# Patient Record
Sex: MALE | Race: BLACK OR AFRICAN AMERICAN | NOT HISPANIC OR LATINO | Employment: UNEMPLOYED | RURAL
[De-identification: names, ages, dates, MRNs, and addresses within clinical notes are randomized per-mention and may not be internally consistent; named-entity substitution may affect disease eponyms.]

---

## 2018-01-29 ENCOUNTER — OFFICE VISIT (OUTPATIENT)
Dept: FAMILY MEDICINE CLINIC | Facility: CLINIC | Age: 63
End: 2018-01-29

## 2018-01-29 VITALS
DIASTOLIC BLOOD PRESSURE: 130 MMHG | BODY MASS INDEX: 20.61 KG/M2 | SYSTOLIC BLOOD PRESSURE: 210 MMHG | OXYGEN SATURATION: 98 % | WEIGHT: 136 LBS | HEIGHT: 68 IN | HEART RATE: 74 BPM | TEMPERATURE: 98 F

## 2018-01-29 DIAGNOSIS — Z23 INFLUENZA VACCINE NEEDED: ICD-10-CM

## 2018-01-29 DIAGNOSIS — I10 ESSENTIAL HYPERTENSION: Primary | ICD-10-CM

## 2018-01-29 PROCEDURE — 99202 OFFICE O/P NEW SF 15 MIN: CPT | Performed by: NURSE PRACTITIONER

## 2018-01-29 RX ORDER — LISINOPRIL AND HYDROCHLOROTHIAZIDE 20; 12.5 MG/1; MG/1
1 TABLET ORAL DAILY
Qty: 30 TABLET | Refills: 0 | Status: SHIPPED | OUTPATIENT
Start: 2018-01-29 | End: 2018-02-19 | Stop reason: SDUPTHER

## 2018-01-29 NOTE — PATIENT INSTRUCTIONS
Hypertension  Hypertension, commonly called high blood pressure, is when the force of blood pumping through your arteries is too strong. Your arteries are the blood vessels that carry blood from your heart throughout your body. A blood pressure reading consists of a higher number over a lower number, such as 110/72. The higher number (systolic) is the pressure inside your arteries when your heart pumps. The lower number (diastolic) is the pressure inside your arteries when your heart relaxes. Ideally you want your blood pressure below 120/80.  Hypertension forces your heart to work harder to pump blood. Your arteries may become narrow or stiff. Having untreated or uncontrolled hypertension can cause heart attack, stroke, kidney disease, and other problems.  What increases the risk?  Some risk factors for high blood pressure are controllable. Others are not.  Risk factors you cannot control include:  · Race. You may be at higher risk if you are .  · Age. Risk increases with age.  · Gender. Men are at higher risk than women before age 45 years. After age 65, women are at higher risk than men.  Risk factors you can control include:  · Not getting enough exercise or physical activity.  · Being overweight.  · Getting too much fat, sugar, calories, or salt in your diet.  · Drinking too much alcohol.  What are the signs or symptoms?  Hypertension does not usually cause signs or symptoms. Extremely high blood pressure (hypertensive crisis) may cause headache, anxiety, shortness of breath, and nosebleed.  How is this diagnosed?  To check if you have hypertension, your health care provider will measure your blood pressure while you are seated, with your arm held at the level of your heart. It should be measured at least twice using the same arm. Certain conditions can cause a difference in blood pressure between your right and left arms. A blood pressure reading that is higher than normal on one occasion does  not mean that you need treatment. If it is not clear whether you have high blood pressure, you may be asked to return on a different day to have your blood pressure checked again. Or, you may be asked to monitor your blood pressure at home for 1 or more weeks.  How is this treated?  Treating high blood pressure includes making lifestyle changes and possibly taking medicine. Living a healthy lifestyle can help lower high blood pressure. You may need to change some of your habits.  Lifestyle changes may include:  · Following the DASH diet. This diet is high in fruits, vegetables, and whole grains. It is low in salt, red meat, and added sugars.  · Keep your sodium intake below 2,300 mg per day.  · Getting at least 30-45 minutes of aerobic exercise at least 4 times per week.  · Losing weight if necessary.  · Not smoking.  · Limiting alcoholic beverages.  · Learning ways to reduce stress.  Your health care provider may prescribe medicine if lifestyle changes are not enough to get your blood pressure under control, and if one of the following is true:  · You are 18-59 years of age and your systolic blood pressure is above 140.  · You are 60 years of age or older, and your systolic blood pressure is above 150.  · Your diastolic blood pressure is above 90.  · You have diabetes, and your systolic blood pressure is over 140 or your diastolic blood pressure is over 90.  · You have kidney disease and your blood pressure is above 140/90.  · You have heart disease and your blood pressure is above 140/90.  Your personal target blood pressure may vary depending on your medical conditions, your age, and other factors.  Follow these instructions at home:  · Have your blood pressure rechecked as directed by your health care provider.  · Take medicines only as directed by your health care provider. Follow the directions carefully. Blood pressure medicines must be taken as prescribed. The medicine does not work as well when you skip  doses. Skipping doses also puts you at risk for problems.  · Do not smoke.  · Monitor your blood pressure at home as directed by your health care provider.  Contact a health care provider if:  · You think you are having a reaction to medicines taken.  · You have recurrent headaches or feel dizzy.  · You have swelling in your ankles.  · You have trouble with your vision.  Get help right away if:  · You develop a severe headache or confusion.  · You have unusual weakness, numbness, or feel faint.  · You have severe chest or abdominal pain.  · You vomit repeatedly.  · You have trouble breathing.  This information is not intended to replace advice given to you by your health care provider. Make sure you discuss any questions you have with your health care provider.  Document Released: 12/18/2006 Document Revised: 05/25/2017 Document Reviewed: 10/10/2014  [employer] Interactive Patient Education © 2017 [employer]

## 2018-01-29 NOTE — PROGRESS NOTES
Subjective   Porter Robertson is a 62 y.o. male.     HPI Comments: Patient also requesting flu vaccine today.     Hypertension   This is a chronic problem. Episode onset: has been on medicine off/on since he was in his 30's. Progression since onset: was last treated about 3 months ago, but no meds since that time.  Recently got insurance and now needs a new PCP.  [facility]  The problem is uncontrolled. Associated symptoms include blurred vision ( reports vision loss due to history of ? syphililis in 2002). Pertinent negatives include no anxiety, chest pain, headaches, malaise/fatigue, neck pain, orthopnea, palpitations, peripheral edema, PND, shortness of breath or sweats. Risk factors for coronary artery disease include male gender and smoking/tobacco exposure. Treatments tried: the only thing he remembers is having a diuretic, Improvement on treatment: when on meds, he reports b/p remained controlled.        The following portions of the patient's history were reviewed and updated as appropriate: allergies, current medications, past medical history, past social history, past surgical history and problem list.    Review of Systems   Constitutional: Negative for appetite change, fatigue, fever, malaise/fatigue and unexpected weight change.   HENT: Negative for congestion, sneezing and sore throat.    Eyes: Positive for blurred vision ( reports vision loss due to history of ? syphililis in 2002). Negative for discharge and itching.        History of vision loss     Respiratory: Negative for chest tightness, shortness of breath and wheezing ( had some wheezing several months ago, but subsided).    Cardiovascular: Negative for chest pain, palpitations, orthopnea, leg swelling and PND.   Gastrointestinal: Negative for nausea and vomiting.   Musculoskeletal: Negative for neck pain and neck stiffness.   Skin: Negative for rash.   Neurological: Negative for dizziness, light-headedness and  "headaches.       Objective    BP (!) 210/130 (BP Location: Left arm, Patient Position: Sitting, Cuff Size: Adult)  Pulse 74  Temp 98 °F (36.7 °C) (Tympanic)   Ht 172.7 cm (68\")  Wt 61.7 kg (136 lb)  SpO2 98%  BMI 20.68 kg/m2    Physical Exam   Constitutional: He is oriented to person, place, and time. No distress.   Neck: No thyromegaly present.   Cardiovascular: Normal rate and regular rhythm.    No peripheral edema noted     Pulmonary/Chest: Effort normal and breath sounds normal. He has no wheezes. He has no rales.   Neurological: He is alert and oriented to person, place, and time.   Nursing note and vitals reviewed.      Assessment/Plan   Porter was seen today for hypertension and flu vaccine.    Diagnoses and all orders for this visit:    Essential hypertension  -     lisinopril-hydrochlorothiazide (ZESTORETIC) 20-12.5 MG per tablet; Take 1 tablet by mouth Daily.    Influenza vaccine needed  -     Discontinue: influenza vac split quad (FLUZONE,FLUARIX,AFLURIA) injection 0.5 mL; Inject 0.5 mL into the shoulder, thigh, or buttocks 1 (One) Time.  -     influenza vac split quad (FLUZONE,FLUARIX,AFLURIA) injection 0.5 mL; Inject 0.5 mL into the shoulder, thigh, or buttocks 1 (One) Time.    Other orders  -     Cancel: Flu Vaccine Quad PF >18YR      Rx for Lisinopril/HCTZ provided  Appt to establish care and for management of HTN with Dr. Avalos on 9-20-55    Flu vaccine administered in office today.          "

## 2018-02-19 ENCOUNTER — OFFICE VISIT (OUTPATIENT)
Dept: FAMILY MEDICINE CLINIC | Facility: CLINIC | Age: 63
End: 2018-02-19

## 2018-02-19 VITALS
WEIGHT: 137 LBS | BODY MASS INDEX: 20.76 KG/M2 | SYSTOLIC BLOOD PRESSURE: 187 MMHG | DIASTOLIC BLOOD PRESSURE: 118 MMHG | RESPIRATION RATE: 16 BRPM | HEIGHT: 68 IN | TEMPERATURE: 98.6 F | HEART RATE: 64 BPM

## 2018-02-19 DIAGNOSIS — I10 ESSENTIAL HYPERTENSION: Primary | ICD-10-CM

## 2018-02-19 DIAGNOSIS — F10.10 ALCOHOL ABUSE: ICD-10-CM

## 2018-02-19 DIAGNOSIS — Z13.1 ENCOUNTER FOR SCREENING FOR DIABETES MELLITUS: ICD-10-CM

## 2018-02-19 DIAGNOSIS — R10.13 EPIGASTRIC PAIN: ICD-10-CM

## 2018-02-19 DIAGNOSIS — Z83.3 FAMILY HISTORY OF DIABETES MELLITUS: ICD-10-CM

## 2018-02-19 DIAGNOSIS — Z12.11 ENCOUNTER FOR SCREENING FOR MALIGNANT NEOPLASM OF COLON: ICD-10-CM

## 2018-02-19 DIAGNOSIS — R10.9 ABDOMINAL PAIN, UNSPECIFIED ABDOMINAL LOCATION: ICD-10-CM

## 2018-02-19 DIAGNOSIS — Z00.00 GENERAL MEDICAL EXAMINATION: ICD-10-CM

## 2018-02-19 PROCEDURE — 80053 COMPREHEN METABOLIC PANEL: CPT | Performed by: FAMILY MEDICINE

## 2018-02-19 PROCEDURE — 84425 ASSAY OF VITAMIN B-1: CPT | Performed by: FAMILY MEDICINE

## 2018-02-19 PROCEDURE — 36415 COLL VENOUS BLD VENIPUNCTURE: CPT | Performed by: FAMILY MEDICINE

## 2018-02-19 PROCEDURE — 82150 ASSAY OF AMYLASE: CPT | Performed by: FAMILY MEDICINE

## 2018-02-19 PROCEDURE — 80074 ACUTE HEPATITIS PANEL: CPT | Performed by: FAMILY MEDICINE

## 2018-02-19 PROCEDURE — 83690 ASSAY OF LIPASE: CPT | Performed by: FAMILY MEDICINE

## 2018-02-19 PROCEDURE — 82607 VITAMIN B-12: CPT | Performed by: FAMILY MEDICINE

## 2018-02-19 PROCEDURE — 85025 COMPLETE CBC W/AUTO DIFF WBC: CPT | Performed by: FAMILY MEDICINE

## 2018-02-19 PROCEDURE — 99204 OFFICE O/P NEW MOD 45 MIN: CPT | Performed by: FAMILY MEDICINE

## 2018-02-19 RX ORDER — LISINOPRIL AND HYDROCHLOROTHIAZIDE 20; 12.5 MG/1; MG/1
1 TABLET ORAL DAILY
Qty: 30 TABLET | Refills: 0 | Status: SHIPPED | OUTPATIENT
Start: 2018-02-19

## 2018-02-19 NOTE — PATIENT INSTRUCTIONS
-cut down on alcohol if possible read information provided    Start taking lisionpril-HCTZ daily.  Will send refill to pharmacy    Recheck in 2 weeks    Get labwork downstairs fasting.  Down elevator turn right    Will call for appt for colonoscopy screening     Alcohol Abuse and Nutrition  Alcohol abuse is any pattern of alcohol consumption that harms your health, relationships, or work. Alcohol abuse can affect how your body breaks down and absorbs nutrients from food by causing your liver to work abnormally. Additionally, many people who abuse alcohol do not eat enough carbohydrates, protein, fat, vitamins, and minerals. This can cause poor nutrition (malnutrition) and a lack of nutrients (nutrient deficiencies), which can lead to further complications.  Nutrients that are commonly lacking (deficient) among people who abuse alcohol include:  · Vitamins.  ¨ Vitamin A. This is stored in your liver. It is important for your vision, metabolism, and ability to fight off infections (immunity).  ¨ B vitamins. These include vitamins such as folate, thiamin, and niacin. These are important in new cell growth and maintenance.  ¨ Vitamin C. This plays an important role in iron absorption, wound healing, and immunity.  ¨ Vitamin D. This is produced by your liver, but you can also get vitamin D from food. Vitamin D is necessary for your body to absorb and use calcium.  · Minerals.  ¨ Calcium. This is important for your bones and your heart and blood vessel (cardiovascular) function.  ¨ Iron. This is important for blood, muscle, and nervous system functioning.  ¨ Magnesium. This plays an important role in muscle and nerve function, and it helps to control blood sugar and blood pressure.  ¨ Zinc. This is important for the normal function of your nervous system and digestive system (gastrointestinal tract).  Nutrition is an essential component of therapy for alcohol abuse. Your health care provider or dietitian will work with  you to design a plan that can help restore nutrients to your body and prevent potential complications.  What is my plan?  Your dietitian may develop a specific diet plan that is based on your condition and any other complications you may have. A diet plan will commonly include:  · A balanced diet.  ¨ Grains: 6-8 oz per day.  ¨ Vegetables: 2-3 cups per day.  ¨ Fruits: 1-2 cups per day.  ¨ Meat and other protein: 5-6 oz per day.  ¨ Dairy: 2-3 cups per day.  · Vitamin and mineral supplements.  What do I need to know about alcohol and nutrition?  · Consume foods that are high in antioxidants, such as grapes, berries, nuts, green tea, and dark green and orange vegetables. This can help to counteract some of the stress that is placed on your liver by consuming alcohol.  · Avoid food and drinks that are high in fat and sugar. Foods such as sugared soft drinks, salty snack foods, and candy contain empty calories. This means that they lack important nutrients such as protein, fiber, and vitamins.  · Eat frequent meals and snacks. Try to eat 5-6 small meals each day.  · Eat a variety of fresh fruits and vegetables each day. This will help you get plenty of water, fiber, and vitamins in your diet.  · Drink plenty of water and other clear fluids. Try to drink at least 48-64 oz (1.5-2 L) of water per day.  · If you are a vegetarian, eat a variety of protein-rich foods. Pair whole grains with plant-based proteins at meals and snacks to obtain the greatest nutrient benefit from your food. For example, eat rice with beans, put peanut butter on whole-grain toast, or eat oatmeal with sunflower seeds.  · Soak beans and whole grains overnight before cooking. This can help your body to absorb the nutrients more easily.  · Include foods fortified with vitamins and minerals in your diet. Commonly fortified foods include milk, orange juice, cereal, and bread.  · If you are malnourished, your dietitian may recommend a high-protein,  high-calorie diet. This may include:  ¨ 2,000-3,000 calories (kilocalories) per day.  ¨  grams of protein per day.  · Your health care provider may recommend a complete nutritional supplement beverage. This can help to restore calories, protein, and vitamins to your body. Depending on your condition, you may be advised to consume this instead of or in addition to meals.  · Limit your intake of caffeine. Replace drinks like coffee and black tea with decaffeinated coffee and herbal tea.  · Eat a variety of foods that are high in omega fatty acids. These include fish, nuts and seeds, and soybeans. These foods may help your liver to recover and may also stabilize your mood.  · Certain medicines may cause changes in your appetite, taste, and weight. Work with your health care provider and dietitian to make any adjustments to your medicines and diet plan.  · Include other healthy lifestyle choices in your daily routine.  ¨ Be physically active.  ¨ Get enough sleep.  ¨ Spend time doing activities that you enjoy.  · If you are unable to take in enough food and calories by mouth, your health care provider may recommend a feeding tube. This is a tube that passes through your nose and throat, directly into your stomach. Nutritional supplement beverages can be given to you through the feeding tube to help you get the nutrients you need.  · Take vitamin or mineral supplements as recommended by your health care provider.  What foods can I eat?  Grains   Enriched pasta. Enriched rice. Fortified whole-grain bread. Fortified whole-grain cereal. Barley. Brown rice. Quinoa. Millet.  Vegetables   All fresh, frozen, and canned vegetables. Spinach. Kale. Artichoke. Carrots. Winter squash and pumpkin. Sweet potatoes. Broccoli. Cabbage. Cucumbers. Tomatoes. Sweet peppers. Green beans. Peas. Corn.  Fruits   All fresh and frozen fruits. Berries. Grapes. [place]. Papaya. Guava. Cherries. Apples. Bananas. Peaches. Plums. Pineapple.  Watermelon. Cantaloupe. Oranges. Avocado.  Meats and Other Protein Sources   Beef liver. Lean beef. Pork. Fresh and canned chicken. Fresh fish. Oysters. Sardines. Canned tuna. Shrimp. Eggs with yolks. Nuts and seeds. Peanut butter. Beans and lentils. Soybeans. Tofu.  Dairy   Whole, low-fat, and nonfat milk. Whole, low-fat, and nonfat yogurt. Cottage cheese. Sour cream. Hard and soft cheeses.  Beverages   Water. Herbal tea. Decaffeinated coffee. Decaffeinated green tea. 100% fruit juice. 100% vegetable juice. Instant breakfast shakes.  Condiments   Ketchup. Mayonnaise. Mustard. Salad dressing. Barbecue sauce.  Sweets and Desserts   Sugar-free ice cream. Sugar-free pudding. Sugar-free gelatin.  Fats and Oils   Butter. Vegetable oil, flaxseed oil, olive oil, and walnut oil.  Other   Complete nutrition shakes. Protein bars. Sugar-free gum.  The items listed above may not be a complete list of recommended foods or beverages. Contact your dietitian for more options.   What foods are not recommended?  Grains   Sugar-sweetened breakfast cereals. Flavored instant oatmeal. Fried breads.  Vegetables   Breaded or deep-fried vegetables.  Fruits   Dried fruit with added sugar. Candied fruit. Canned fruit in syrup.  Meats and Other Protein Sources   Breaded or deep-fried meats.  Dairy   Flavored milks. Fried cheese curds or fried cheese sticks.  Beverages   Alcohol. Sugar-sweetened soft drinks. Sugar-sweetened tea. Caffeinated coffee and tea.  Condiments   Sugar. Honey. Agave nectar. Molasses.  Sweets and Desserts   Chocolate. Cake. Cookies. Candy.  Other   Potato chips. Pretzels. Salted nuts. Candied nuts.  The items listed above may not be a complete list of foods and beverages to avoid. Contact your dietitian for more information.   This information is not intended to replace advice given to you by your health care provider. Make sure you discuss any questions you have with your health care provider.  Document Released:  10/12/2006 Document Revised: 04/26/2017 Document Reviewed: 07/21/2015  [employer] Interactive Patient Education © 2017 [employer]    Alcohol Use Disorder  Alcohol use disorder is when your drinking disrupts your daily life. When you have this condition, you drink too much alcohol and you cannot control your drinking.  Alcohol use disorder can cause serious problems with your physical health. It can affect your brain, heart, liver, pancreas, immune system, stomach, and intestines. Alcohol use disorder can increase your risk for certain cancers and cause problems with your mental health, such as depression, anxiety, psychosis, delirium, and dementia. People with this disorder risk hurting themselves and others.  What are the causes?  This condition is caused by drinking too much alcohol over time. It is not caused by drinking too much alcohol only one or two times. Some people with this condition drink alcohol to cope with or escape from negative life events. Others drink to relieve pain or symptoms of mental illness.  What increases the risk?  You are more likely to develop this condition if:  · You have a family history of alcohol use disorder.  · Your culture encourages drinking to the point of intoxication, or makes alcohol easy to get.  · You had a mood or conduct disorder in childhood.  · You have been a victim of abuse.  · You are an adolescent and:  ¨ You have poor grades or difficulties in school.  ¨ Your caregivers do not talk to you about saying no to alcohol, or supervise your activities.  ¨ You are impulsive or you have trouble with self-control.  What are the signs or symptoms?  Symptoms of this condition include:  · Drinking more than you want to.  · Drinking for longer than you want to.  · Trying several times to drink less or to control your drinking.  · Spending a lot of time getting alcohol, drinking, or recovering from drinking.  · Craving alcohol.  · Having problems at work, at school, or at home  due to drinking.  · Having problems in relationships due to drinking.  · Drinking when it is dangerous to drink, such as before driving a car.  · Continuing to drink even though you know you might have a physical or mental problem related to drinking.  · Needing more and more alcohol to get the same effect you want from the alcohol (building up tolerance).  · Having symptoms of withdrawal when you stop drinking. Symptoms of withdrawal include:  ¨ Fatigue.  ¨ Nightmares.  ¨ Trouble sleeping.  ¨ Depression.  ¨ Anxiety.  ¨ Fever.  ¨ Seizures.  ¨ Severe confusion.  ¨ Feeling or seeing things that are not there (hallucinations).  ¨ Tremors.  ¨ Rapid heart rate.  ¨ Rapid breathing.  ¨ High blood pressure.  · Drinking to avoid symptoms of withdrawal.  How is this diagnosed?  This condition is diagnosed with an assessment. Your health care provider may start the assessment by asking three or four questions about your drinking.  Your health care provider may perform a physical exam or do lab tests to see if you have physical problems resulting from alcohol use. She or he may refer you to a mental health professional for evaluation.  How is this treated?  Some people with alcohol use disorder are able to reduce their alcohol use to low-risk levels. Others need to completely quit drinking alcohol. When necessary, mental health professionals with specialized training in substance use treatment can help. Your health care provider can help you decide how severe your alcohol use disorder is and what type of treatment you need. The following forms of treatment are available:  · Detoxification. Detoxification involves quitting drinking and using prescription medicines within the first week to help lessen withdrawal symptoms. This treatment is important for people who have had withdrawal symptoms before and for heavy drinkers who are likely to have withdrawal symptoms. Alcohol withdrawal can be dangerous, and in severe cases, it can  cause death. Detoxification may be provided in a home, community, or primary care setting, or in a hospital or substance use treatment facility.  · Counseling. This treatment is also called talk therapy. It is provided by substance use treatment counselors. A counselor can address the reasons you use alcohol and suggest ways to keep you from drinking again or to prevent problem drinking. The goals of talk therapy are to:  ¨ Find healthy activities and ways for you to cope with stress.  ¨ Identify and avoid the things that trigger your alcohol use.  ¨ Help you learn how to handle cravings.  · Medicines. Medicines can help treat alcohol use disorder by:  ¨ Decreasing alcohol cravings.  ¨ Decreasing the positive feeling you have when you drink alcohol.  ¨ Causing an uncomfortable physical reaction when you drink alcohol (aversion therapy).  · Support groups. Support groups are led by people who have quit drinking. They provide emotional support, advice, and guidance.  These forms of treatment are often combined. Some people with this condition benefit from a combination of treatments provided by specialized substance use treatment centers.  Follow these instructions at home:  · Take over-the-counter and prescription medicines only as told by your health care provider.  · Check with your health care provider before starting any new medicines.  · Ask friends and family members not to offer you alcohol.  · Avoid situations where alcohol is served, including gatherings where others are drinking alcohol.  · Create a plan for what to do when you are tempted to use alcohol.  · Find hobbies or activities that you enjoy that do not include alcohol.  · Keep all follow-up visits as told by your health care provider. This is important.  How is this prevented?  · If you drink, limit alcohol intake to no more than 1 drink a day for nonpregnant women and 2 drinks a day for men. One drink equals 12 oz of beer, 5 oz of wine, or 1½ oz  of hard liquor.  · If you have a mental health condition, get treatment and support.  · Do not give alcohol to adolescents.  · If you are an adolescent:  ¨ Do not drink alcohol.  ¨ Do not be afraid to say no if someone offers you alcohol. Speak up about why you do not want to drink. You can be a positive role model for your friends and set a good example for those around you by not drinking alcohol.  ¨ If your friends drink, spend time with others who do not drink alcohol. Make new friends who do not use alcohol.  ¨ Find healthy ways to manage stress and emotions, such as meditation or deep breathing, exercise, spending time in nature, listening to music, or talking with a trusted friend or family member.  Contact a health care provider if:  · You are not able to take your medicines as told.  · Your symptoms get worse.  · You return to drinking alcohol (relapse) and your symptoms get worse.  Get help right away if:  · You have thoughts about hurting yourself or others.  If you ever feel like you may hurt yourself or others, or have thoughts about taking your own life, get help right away. You can go to your nearest emergency department or call:  · Your local emergency services (911 in the [place]  · A suicide crisis helpline, such as the National Suicide Prevention Lifeline at 1-923.799.5449. This is open 24 hours a day.  Summary  · Alcohol use disorder is when your drinking disrupts your daily life. When you have this condition, you drink too much alcohol and you cannot control your drinking.  · Treatment may include detoxification, counseling, medicine, and support groups.  · Ask friends and family members not to offer you alcohol. Avoid situations where alcohol is served.  · Get help right away if you have thoughts about hurting yourself or others.  This information is not intended to replace advice given to you by your health care provider. Make sure you discuss any questions you have with your health care  provider.  Document Released: 01/25/2006 Document Revised: 09/14/2017 Document Reviewed: 09/14/2017  [employer] Interactive Patient Education © 2017 [employer]    Colonoscopy, Adult  A colonoscopy is an exam to look at the entire large intestine. During the exam, a lubricated, bendable tube is inserted into the anus and then passed into the rectum, colon, and other parts of the large intestine.  A colonoscopy is often done as a part of normal colorectal screening or in response to certain symptoms, such as anemia, persistent diarrhea, abdominal pain, and blood in the stool. The exam can help screen for and diagnose medical problems, including:  · Tumors.  · Polyps.  · Inflammation.  · Areas of bleeding.  Tell a health care provider about:  · Any allergies you have.  · All medicines you are taking, including vitamins, herbs, eye drops, creams, and over-the-counter medicines.  · Any problems you or family members have had with anesthetic medicines.  · Any blood disorders you have.  · Any surgeries you have had.  · Any medical conditions you have.  · Any problems you have had passing stool.  What are the risks?  Generally, this is a safe procedure. However, problems may occur, including:  · Bleeding.  · A tear in the intestine.  · A reaction to medicines given during the exam.  · Infection (rare).  What happens before the procedure?  Eating and drinking restrictions   Follow instructions from your health care provider about eating and drinking, which may include:  · A few days before the procedure - follow a low-fiber diet. Avoid nuts, seeds, dried fruit, raw fruits, and vegetables.  · 1-3 days before the procedure - follow a clear liquid diet. Drink only clear liquids, such as clear broth or bouillon, black coffee or tea, clear juice, clear soft drinks or sports drinks, gelatin dessert, and popsicles. Avoid any liquids that contain red or purple dye.  · On the day of the procedure - do not eat or drink anything  during the 2 hours before the procedure, or within the time period that your health care provider recommends.  Bowel prep   If you were prescribed an oral bowel prep to clean out your colon:  · Take it as told by your health care provider. Starting the day before your procedure, you will need to drink a large amount of medicated liquid. The liquid will cause you to have multiple loose stools until your stool is almost clear or light green.  · If your skin or anus gets irritated from diarrhea, you may use these to relieve the irritation:  ¨ Medicated wipes, such as adult wet wipes with aloe and vitamin E.  ¨ A skin soothing-product like petroleum jelly.  · If you vomit while drinking the bowel prep, take a break for up to 60 minutes and then begin the bowel prep again. If vomiting continues and you cannot take the bowel prep without vomiting, call your health care provider.  General instructions   · Ask your health care provider about changing or stopping your regular medicines. This is especially important if you are taking diabetes medicines or blood thinners.  · Plan to have someone take you home from the hospital or clinic.  What happens during the procedure?  · An IV tube may be inserted into one of your veins.  · You will be given medicine to help you relax (sedative).  · To reduce your risk of infection:  ¨ Your health care team will wash or sanitize their hands.  ¨ Your anal area will be washed with soap.  · You will be asked to lie on your side with your knees bent.  · Your health care provider will lubricate a long, thin, flexible tube. The tube will have a camera and a light on the end.  · The tube will be inserted into your anus.  · The tube will be gently eased through your rectum and colon.  · Air will be delivered into your colon to keep it open. You may feel some pressure or cramping.  · The camera will be used to take images during the procedure.  · A small tissue sample may be removed from your body  to be examined under a microscope (biopsy). If any potential problems are found, the tissue will be sent to a lab for testing.  · If small polyps are found, your health care provider may remove them and have them checked for cancer cells.  · The tube that was inserted into your anus will be slowly removed.  The procedure may vary among health care providers and hospitals.  What happens after the procedure?  · Your blood pressure, heart rate, breathing rate, and blood oxygen level will be monitored until the medicines you were given have worn off.  · Do not drive for 24 hours after the exam.  · You may have a small amount of blood in your stool.  · You may pass gas and have mild abdominal cramping or bloating due to the air that was used to inflate your colon during the exam.  · It is up to you to get the results of your procedure. Ask your health care provider, or the department performing the procedure, when your results will be ready.  This information is not intended to replace advice given to you by your health care provider. Make sure you discuss any questions you have with your health care provider.  Document Released: 12/15/2001 Document Revised: 10/18/2017 Document Reviewed: 02/28/2017  [employer] Interactive Patient Education © 2017 [employer]    Alcohol Withdrawal  Alcohol withdrawal is a group of symptoms that can develop when a person who drinks heavily and regularly stops drinking or drinks less.  What are the causes?  Heavy and regular drinking can cause chemicals that send signals from the brain to the body (neurotransmitters) to deactivate. Alcohol withdrawal develops when deactivated neurotransmitters reactivate because a person stops drinking or drinks less.  What increases the risk?  The more a person drinks and the longer he or she drinks, the greater the risk of alcohol withdrawal. Severe withdrawal is more likely to develop in someone who:  · Had severe alcohol withdrawal in the past.  · Had a  seizure during a previous episode of alcohol withdrawal.  · Is elderly.  · Is pregnant.  · Has been abusing drugs.  · Has other medical problems, including:  ¨ Infection.  ¨ Heart, lung, or liver disease.  ¨ Seizures.  ¨ Mental health problems.  What are the signs or symptoms?  Symptoms of this condition can be mild to moderate, or they can be severe.  Mild to moderate symptoms may include:  · Fatigue.  · Nightmares.  · Trouble sleeping.  · Depression.  · Anxiety.  · Inability to think clearly.  · Mood swings.  · Irritability.  · Loss of appetite.  · Nausea or vomiting.  · Clammy skin.  · Extreme sweating.  · Rapid heartbeat.  · Shakiness.  · Uncontrollable shaking (tremor).  Severe symptoms may include:  · Fever.  · Seizures.  · Severe confusion.  · Feeling or seeing things that are not there (hallucinations).  Symptoms usually begin within eight hours after a person stops drinking or drinks less. They can last for weeks.  How is this diagnosed?  Alcohol withdrawal is diagnosed with a medical history and physical exam. Sometimes, urine and blood tests are also done.  How is this treated?  Treatment may involve:  · Monitoring blood pressure, pulse, and breathing.  · Getting fluids through an IV tube.  · Medicine to reduce anxiety.  · Medicine to prevent or control seizures.  · Multivitamins and B vitamins.  · Having a health care provider check on you daily.  If symptoms are moderate to severe or if there is a risk of severe withdrawal, treatment may be done at a hospital or treatment center.  Follow these instructions at home:  · Take medicines and vitamin supplements only as directed by your health care provider.  · Do not drink alcohol.  · Have someone stay with you or be available if you need help.  · Drink enough fluid to keep your urine clear or pale yellow.  · Consider joining a 12-step program or another alcohol support group.  Contact a health care provider if:  · Your symptoms get worse or do not go  away.  · You cannot keep food or water in your stomach.  · You are struggling with not drinking alcohol.  · You cannot stop drinking alcohol.  Get help right away if:  · You have an irregular heartbeat.  · You have chest pain.  · You have trouble breathing.  · You have symptoms of severe withdrawal, such as:  ¨ A fever.  ¨ Seizures.  ¨ Severe confusion.  ¨ Hallucinations.  This information is not intended to replace advice given to you by your health care provider. Make sure you discuss any questions you have with your health care provider.  Document Released: 09/27/2006 Document Revised: 04/26/2017 Document Reviewed: 10/06/2015  [employer] Interactive Patient Education © 2017 [employer]

## 2018-02-19 NOTE — PROGRESS NOTES
Subjective   Porter Robertson is a 62 y.o. male.         Problem List  1. Essential Hypertension  2. Alcohol abuse  3. Tobacco smoker  4. H/O syphillis     PSHX  - Hernia surgery in    - hemorrhoid surgery     Social Hx  - alcohol abuse drinks 6 pack every 3 days.    - tobacco smoker 1/2 ppd for every 15 years   - no illicit drugs  -1 child  -.    -used to work in cafeteria.  Currently unemployed currently on disability     familyHx  -father- father () cataracts, cancer  -  Mother - () unknown. Mother  when pt was 3 year old  - sister diabetes, asthma     Pt is 63 yo male who I am seeing for the first time. He is here to establish . Pt was recently seen by BRAIN Mcfarland on 18  For her blood pressure issues. He takes lisinoopril-HCTZ 20-12.5 mg PO q daily. But he stopped taking medication recently due to abdominal pain. His episode happened once and occurs in epigastric area.  He has taken it since. BP is elevated today.  He also has history of alcohol abuse and drinks hard liquor and alcohol about 2-3 times a week. He also smokes 1/2 ppd of cigarettes. He had hernia surgeyr in  and hemorrhoid surgery in .  Pt denies alcohol abuse. He has 1 child and is .  Pt lives with girlfriend.  He does have family history of cancer diabetes and asthma. He is due for new labwork and colonoscopy screening   Pt was enrolled in programs before for alcohol abuse but he continues to drink. States that neighbors and friends bring him alcohol     Hypertension   This is a new problem. The current episode started more than 1 year ago. The problem is unchanged. The problem is uncontrolled. Pertinent negatives include no anxiety, blurred vision, chest pain, headaches, malaise/fatigue, neck pain, orthopnea, palpitations, peripheral edema, PND or shortness of breath. There are no associated agents to hypertension. Risk factors for coronary artery disease include male gender. Past  "treatments include ACE inhibitors and diuretics. The current treatment provides no improvement. There are no compliance problems.  There is no history of angina, kidney disease, CAD/MI, CVA, heart failure, left ventricular hypertrophy, PVD, renovascular disease, retinopathy or a thyroid problem. There is no history of chronic renal disease, coarctation of the aorta, hyperaldosteronism, hypercortisolism, hyperparathyroidism, a hypertension causing med, pheochromocytoma or sleep apnea.          The following portions of the patient's history were reviewed and updated as appropriate: allergies, current medications, past family history, past medical history, past social history, past surgical history and problem list.    Review of Systems   Constitutional: Negative.  Negative for malaise/fatigue.   HENT: Negative.    Eyes: Negative.  Negative for blurred vision.   Respiratory: Negative.  Negative for shortness of breath.    Cardiovascular: Negative.  Negative for chest pain, palpitations, orthopnea and PND.   Gastrointestinal: Positive for abdominal distention and abdominal pain.   Endocrine: Negative.    Genitourinary: Negative.    Musculoskeletal: Negative.  Negative for neck pain.   Skin: Negative.    Allergic/Immunologic: Negative.    Neurological: Negative.  Negative for headaches.   Hematological: Negative.    Psychiatric/Behavioral: Negative.        Objective    BP (!) 187/118  Pulse 64  Temp 98.6 °F (37 °C)  Resp 16  Ht 172.7 cm (68\")  Wt 62.1 kg (137 lb)  BMI 20.83 kg/m2    Physical Exam   Constitutional: He is oriented to person, place, and time. He appears well-developed and well-nourished. No distress.   HENT:   Head: Normocephalic and atraumatic.   Right Ear: External ear normal.   Eyes: Conjunctivae and EOM are normal. Pupils are equal, round, and reactive to light. Right eye exhibits no discharge. Left eye exhibits no discharge. No scleral icterus.   Neck: Normal range of motion. Neck supple. No JVD " present. No tracheal deviation present. No thyromegaly present.   Cardiovascular: Normal rate, regular rhythm and normal heart sounds.    Pulmonary/Chest: Effort normal and breath sounds normal. No stridor. No respiratory distress. He has no wheezes.   Abdominal: Soft. He exhibits no distension and no mass. There is no tenderness. There is no rebound and no guarding. No hernia.   Musculoskeletal: Normal range of motion. He exhibits tenderness. He exhibits no edema or deformity.   Lymphadenopathy:     He has no cervical adenopathy.   Neurological: He is alert and oriented to person, place, and time. No cranial nerve deficit. Coordination normal.   Skin: Skin is warm and dry. No rash noted. He is not diaphoretic. No erythema. No pallor.   Psychiatric: He has a normal mood and affect. His behavior is normal.   Nursing note and vitals reviewed.      Assessment/Plan   Problems Addressed this Visit        Cardiovascular and Mediastinum    Essential hypertension - Primary       Nervous and Auditory    Epigastric pain    Abdominal pain    Relevant Orders    CBC Auto Differential    Comprehensive Metabolic Panel    Hepatitis Panel, Acute    CBC Auto Differential    Vitamin B12    Vitamin B1, Whole Blood    Amylase    Lipase       Other    General medical examination    Relevant Orders    CBC Auto Differential    Comprehensive Metabolic Panel    Hepatitis Panel, Acute    CBC Auto Differential    Vitamin B12    Vitamin B1, Whole Blood    Amylase    Lipase    Encounter for screening for diabetes mellitus    Relevant Orders    CBC Auto Differential    Comprehensive Metabolic Panel    Hepatitis Panel, Acute    CBC Auto Differential    Vitamin B12    Vitamin B1, Whole Blood    Amylase    Lipase    Alcohol abuse    Family history of diabetes mellitus    Encounter for screening for malignant neoplasm of colon    Relevant Orders    Ambulatory Referral to Gastroenterology        - will get basic labs today.  Was going to order thyroid  studies, lipid panel and hga1c but insurance would not cover test.  Will check b12, vitamin B1 levels  - Hep C screening - hepatitis panel  - hypertension - advised pt to continue lisionpril-HCTZ combination pill daily. Recheck in 2 weeks  - for abdominal pain epigastric pain check amylase and lipase  - pt counseled to stop drinking.  Information on alcohol abuse and withdrawal given today.  Will provide number for AA. Counseled pt >5 minutes to stop drinking  - recheck in 2 weeks   - will refer to [facility] Oquendo/Dr. Vu for colonoscopy screening -consultation appreciated

## 2018-02-20 LAB
ALBUMIN SERPL-MCNC: 4.4 G/DL (ref 3.4–4.8)
ALBUMIN/GLOB SERPL: 1.3 G/DL (ref 1.1–1.8)
ALP SERPL-CCNC: 104 U/L (ref 38–126)
ALT SERPL W P-5'-P-CCNC: 22 U/L (ref 21–72)
AMYLASE SERPL-CCNC: 150 U/L (ref 50–130)
ANION GAP SERPL CALCULATED.3IONS-SCNC: 14 MMOL/L (ref 5–15)
AST SERPL-CCNC: 21 U/L (ref 17–59)
BASOPHILS # BLD AUTO: 0.04 10*3/MM3 (ref 0–0.2)
BASOPHILS NFR BLD AUTO: 0.4 % (ref 0–2)
BILIRUB SERPL-MCNC: 0.3 MG/DL (ref 0.2–1.3)
BUN BLD-MCNC: 12 MG/DL (ref 7–21)
BUN/CREAT SERPL: 10.7 (ref 7–25)
CALCIUM SPEC-SCNC: 10.3 MG/DL (ref 8.4–10.2)
CHLORIDE SERPL-SCNC: 101 MMOL/L (ref 95–110)
CO2 SERPL-SCNC: 26 MMOL/L (ref 22–31)
CREAT BLD-MCNC: 1.12 MG/DL (ref 0.7–1.3)
DEPRECATED RDW RBC AUTO: 41 FL (ref 35.1–43.9)
EOSINOPHIL # BLD AUTO: 0.34 10*3/MM3 (ref 0–0.7)
EOSINOPHIL NFR BLD AUTO: 3.8 % (ref 0–7)
ERYTHROCYTE [DISTWIDTH] IN BLOOD BY AUTOMATED COUNT: 13.2 % (ref 11.5–14.5)
GFR SERPL CREATININE-BSD FRML MDRD: 81 ML/MIN/1.73 (ref 49–113)
GLOBULIN UR ELPH-MCNC: 3.4 GM/DL (ref 2.3–3.5)
GLUCOSE BLD-MCNC: 78 MG/DL (ref 60–100)
HCT VFR BLD AUTO: 42.7 % (ref 39–49)
HGB BLD-MCNC: 13.7 G/DL (ref 13.7–17.3)
IMM GRANULOCYTES # BLD: 0.09 10*3/MM3 (ref 0–0.02)
IMM GRANULOCYTES NFR BLD: 1 % (ref 0–0.5)
LIPASE SERPL-CCNC: 134 U/L (ref 23–300)
LYMPHOCYTES # BLD AUTO: 1.69 10*3/MM3 (ref 0.6–4.2)
LYMPHOCYTES NFR BLD AUTO: 18.7 % (ref 10–50)
MCH RBC QN AUTO: 27.4 PG (ref 26.5–34)
MCHC RBC AUTO-ENTMCNC: 32.1 G/DL (ref 31.5–36.3)
MCV RBC AUTO: 85.4 FL (ref 80–98)
MONOCYTES # BLD AUTO: 1.05 10*3/MM3 (ref 0–0.9)
MONOCYTES NFR BLD AUTO: 11.6 % (ref 0–12)
NEUTROPHILS # BLD AUTO: 5.84 10*3/MM3 (ref 2–8.6)
NEUTROPHILS NFR BLD AUTO: 64.5 % (ref 37–80)
PLATELET # BLD AUTO: 213 10*3/MM3 (ref 150–450)
PMV BLD AUTO: 13.8 FL (ref 8–12)
POTASSIUM BLD-SCNC: 4.4 MMOL/L (ref 3.5–5.1)
PROT SERPL-MCNC: 7.8 G/DL (ref 6.3–8.6)
RBC # BLD AUTO: 5 10*6/MM3 (ref 4.37–5.74)
SODIUM BLD-SCNC: 141 MMOL/L (ref 137–145)
VIT B12 BLD-MCNC: 715 PG/ML (ref 239–931)
WBC NRBC COR # BLD: 9.05 10*3/MM3 (ref 3.2–9.8)

## 2018-02-21 LAB
HAV IGM SERPL QL IA: NEGATIVE
HBV CORE IGM SERPL QL IA: NEGATIVE
HBV SURFACE AG SERPL QL IA: NEGATIVE
HCV AB SER DONR QL: NEGATIVE

## 2018-02-24 LAB — VIT B1 BLD-SCNC: 110.2 NMOL/L (ref 66.5–200)
